# Patient Record
Sex: MALE | Race: BLACK OR AFRICAN AMERICAN | NOT HISPANIC OR LATINO | Employment: FULL TIME | URBAN - METROPOLITAN AREA
[De-identification: names, ages, dates, MRNs, and addresses within clinical notes are randomized per-mention and may not be internally consistent; named-entity substitution may affect disease eponyms.]

---

## 2020-09-19 NOTE — PROGRESS NOTES
Subjective:       Patient ID: Juan Carlos Cash is a pleasant 39 y.o. Black or  male patient    Chief Complaint: Annual Exam      Patient new to me and our practice.  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    HPI     He reports not having had a physical for 17 years.  He is anxious to come back and see a physician, it makes him very nervous.   He was on BP medications until 2 years ago, he does not remember which ones and cannot say why he hold these.  He has no specific complaint but feels short-winded at any effort due to morbid obesity.  He is dedicated to do better as he is aware hat his lifestyle is the main culprit for this.  He has a very poor diet and also does not exercise anymore while he was very active while in college.   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 He needs a medical clearance for this as well as the flu shot.    He would like to be tested for COVID though it is not required for his job.   He has skin lesions on lower extremities as well as on his scalp and beard.    He was told that he may have proteinuria?      Patient Active Problem List   Diagnosis    Class 3 severe obesity due to excess calories with serious comorbidity and body mass index (BMI) greater than or equal to 70 in adult    Shortness of breath    Essential hypertension    Seborrheic dermatitis of scalp    Proteinuria          ACTIVE MEDICAL ISSUES:  Documented in Problem List     PAST MEDICAL HISTORY  Documented     PAST SURGICAL HISTORY:  Documented     SOCIAL HISTORY:  Documented     FAMILY HISTORY:  Documented     ALLERGIES AND MEDICATIONS: updated and reviewed.  Documented    Review of Systems   Constitutional: Negative for activity change, appetite change and fatigue.    HENT: Negative.  Negative for postnasal drip, rhinorrhea, sinus pressure and sore throat.    Eyes: Negative for pain and redness.   Respiratory: Positive for shortness of breath. Negative for cough, chest tightness and wheezing.    Cardiovascular: Negative for chest pain, palpitations and leg swelling.   Gastrointestinal: Negative for abdominal pain and constipation.   Genitourinary: Negative for frequency and urgency.   Musculoskeletal: Negative for back pain, gait problem and myalgias.   Skin: Positive for rash. Negative for color change.   Allergic/Immunologic: Negative for environmental allergies and food allergies.   Neurological: Negative for headaches.   Hematological: Negative for adenopathy. Does not bruise/bleed easily.   Psychiatric/Behavioral: Negative for behavioral problems, sleep disturbance and suicidal ideas. The patient is not nervous/anxious.        Objective:      Physical Exam  Vitals signs and nursing note reviewed.   Constitutional:       Appearance: He is well-developed. He is obese.   HENT:      Right Ear: External ear normal.      Left Ear: External ear normal.   Eyes:      Conjunctiva/sclera: Conjunctivae normal.      Pupils: Pupils are equal, round, and reactive to light.   Neck:      Musculoskeletal: Normal range of motion.      Thyroid: No thyromegaly.   Cardiovascular:      Rate and Rhythm: Normal rate and regular rhythm.      Heart sounds: Normal heart sounds.   Pulmonary:      Effort: Pulmonary effort is normal.      Breath sounds: Normal breath sounds. No wheezing.   Chest:      Chest wall: No tenderness.   Abdominal:      General: Bowel sounds are normal.      Palpations: Abdomen is soft. There is no mass.      Tenderness: There is no abdominal tenderness.   Musculoskeletal: Normal range of motion.         General: No tenderness or deformity.   Lymphadenopathy:      Cervical: No cervical adenopathy.   Skin:     General: Skin is warm and dry.      Comments: Severe seborrheic  "dermatitis of the scalp and beard area.   LE with very dry skin that is cracked, with dermatitis   Neurological:      Mental Status: He is alert and oriented to person, place, and time.   Psychiatric:         Behavior: Behavior normal.         Thought Content: Thought content normal.         Judgment: Judgment normal.         Vitals:    09/21/20 0828   BP: (!) 170/106   BP Location: Left arm   Patient Position: Sitting   BP Method: Thigh Cuff (Manual)   Pulse: 105   Resp: 20   Temp: 99.5 °F (37.5 °C)   TempSrc: Oral   SpO2: 97%   Weight: (!) 244.1 kg (538 lb 2.3 oz)   Height: 5' 7" (1.702 m)     Body mass index is 84.29 kg/m².    RESULTS:  Will review present blood work.    Assessment:       1. Encounter for annual physical exam    2. Class 3 severe obesity due to excess calories with serious comorbidity and body mass index (BMI) greater than or equal to 70 in adult    3. Shortness of breath    4. Essential hypertension    5. Seborrheic dermatitis of scalp    6. Proteinuria, unspecified type    7. Need for influenza vaccination        Plan:   Juan Carlos was seen today for annual exam.    Diagnoses and all orders for this visit:    Encounter for annual physical exam  -     CBC auto differential; Future  -     Comprehensive metabolic panel; Future  -     Hemoglobin A1C; Future  -     TSH; Future  -     Lipid Panel; Future    Patient with BMI 84, short-winded possibly only due to this, however cannot rule out HFpEF due to habitus. Will probably need to see cardiologist.  Will do blood work including BNP, and recheck for proteinuria due to his report of being told that he had this condition in the past.  Will refer to nutritionist as initial step as lifestyle is very poor.    Will be seen on the end of the week to reassess after blood work by MARIBEL العلي, as I am out of town until 10/05/2020, day he is scheduled to go back to teach in the school. 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 For now remote only. Need to see what are his risk factors. May have to have a TTE done to rule out HFrEF, and to see if DM, as these may add to the risk factor of morbid obesity.I will see him back after one month afterwards to f-up and to establish a good patient-physician relationship.    Class 3 severe obesity due to excess calories with serious comorbidity and body mass index (BMI) greater than or equal to 70 in adult  -     Ambulatory referral/consult to Nutrition Services; Future    See above.    Shortness of breath  -     Brain Natriuretic Peptide; Future    See above.    Essential hypertension    BP not at goal.  Will need to be reassessed for treatment after receiving results of the blood work.    Seborrheic dermatitis of scalp  -     ketoconazole (NIZORAL) 2 % shampoo; Apply topically twice a week.    Will order Nizoral shampoo.    Proteinuria, unspecified type  -     Microalbumin/creatinine urine ratio; Future    Will check as reported by patient, see HPI.    Need for influenza vaccination  -     Influenza - Quadrivalent (PF)    Administered today.      Follow up in about 4 days (around 9/25/2020) for f-up with Stephanie Bills.    This note was created by combination of typed  and M-Modal dictation.  Transcription errors may be present.  If there are any questions, please contact me.

## 2020-09-21 ENCOUNTER — OFFICE VISIT (OUTPATIENT)
Dept: FAMILY MEDICINE | Facility: CLINIC | Age: 40
End: 2020-09-21
Payer: COMMERCIAL

## 2020-09-21 VITALS
RESPIRATION RATE: 20 BRPM | DIASTOLIC BLOOD PRESSURE: 106 MMHG | OXYGEN SATURATION: 97 % | TEMPERATURE: 100 F | BODY MASS INDEX: 49.44 KG/M2 | HEART RATE: 105 BPM | HEIGHT: 67 IN | WEIGHT: 315 LBS | SYSTOLIC BLOOD PRESSURE: 170 MMHG

## 2020-09-21 DIAGNOSIS — Z00.00 ENCOUNTER FOR ANNUAL PHYSICAL EXAM: ICD-10-CM

## 2020-09-21 DIAGNOSIS — I10 ESSENTIAL HYPERTENSION: ICD-10-CM

## 2020-09-21 DIAGNOSIS — L21.9 SEBORRHEIC DERMATITIS OF SCALP: ICD-10-CM

## 2020-09-21 DIAGNOSIS — R06.02 SHORTNESS OF BREATH: ICD-10-CM

## 2020-09-21 DIAGNOSIS — E66.01 CLASS 3 SEVERE OBESITY DUE TO EXCESS CALORIES WITH SERIOUS COMORBIDITY AND BODY MASS INDEX (BMI) GREATER THAN OR EQUAL TO 70 IN ADULT: ICD-10-CM

## 2020-09-21 DIAGNOSIS — Z23 NEED FOR INFLUENZA VACCINATION: ICD-10-CM

## 2020-09-21 DIAGNOSIS — R80.9 PROTEINURIA, UNSPECIFIED TYPE: ICD-10-CM

## 2020-09-21 PROBLEM — E66.813 CLASS 3 SEVERE OBESITY DUE TO EXCESS CALORIES WITH SERIOUS COMORBIDITY AND BODY MASS INDEX (BMI) GREATER THAN OR EQUAL TO 70 IN ADULT: Status: ACTIVE | Noted: 2020-09-21

## 2020-09-21 PROCEDURE — 99999 PR PBB SHADOW E&M-NEW PATIENT-LVL IV: ICD-10-PCS | Mod: PBBFAC,,, | Performed by: INTERNAL MEDICINE

## 2020-09-21 PROCEDURE — 90686 FLU VACCINE (QUAD) GREATER THAN OR EQUAL TO 3YO PRESERVATIVE FREE IM: ICD-10-PCS | Mod: S$GLB,,, | Performed by: INTERNAL MEDICINE

## 2020-09-21 PROCEDURE — 90471 FLU VACCINE (QUAD) GREATER THAN OR EQUAL TO 3YO PRESERVATIVE FREE IM: ICD-10-PCS | Mod: S$GLB,,, | Performed by: INTERNAL MEDICINE

## 2020-09-21 PROCEDURE — 99999 PR PBB SHADOW E&M-NEW PATIENT-LVL IV: CPT | Mod: PBBFAC,,, | Performed by: INTERNAL MEDICINE

## 2020-09-21 PROCEDURE — 99385 PR PREVENTIVE VISIT,NEW,18-39: ICD-10-PCS | Mod: 25,S$GLB,, | Performed by: INTERNAL MEDICINE

## 2020-09-21 PROCEDURE — 90686 IIV4 VACC NO PRSV 0.5 ML IM: CPT | Mod: S$GLB,,, | Performed by: INTERNAL MEDICINE

## 2020-09-21 PROCEDURE — 99385 PREV VISIT NEW AGE 18-39: CPT | Mod: 25,S$GLB,, | Performed by: INTERNAL MEDICINE

## 2020-09-21 PROCEDURE — 90471 IMMUNIZATION ADMIN: CPT | Mod: S$GLB,,, | Performed by: INTERNAL MEDICINE

## 2020-09-21 RX ORDER — KETOCONAZOLE 20 MG/ML
SHAMPOO, SUSPENSION TOPICAL
Qty: 120 ML | Refills: 2 | Status: CANCELLED | OUTPATIENT
Start: 2020-09-21

## 2020-09-21 RX ORDER — KETOCONAZOLE 20 MG/ML
SHAMPOO, SUSPENSION TOPICAL
Qty: 120 ML | Refills: 2 | Status: SHIPPED | OUTPATIENT
Start: 2020-09-21 | End: 2020-10-01 | Stop reason: SDUPTHER

## 2020-09-21 NOTE — ASSESSMENT & PLAN NOTE
BMI 84, patient not seen for several years.  Will need referral to nutritionist and close follow-up with blood work.  Concerning situation in this young patient.

## 2020-09-22 ENCOUNTER — TELEPHONE (OUTPATIENT)
Dept: FAMILY MEDICINE | Facility: CLINIC | Age: 40
End: 2020-09-22

## 2020-09-22 ENCOUNTER — LAB VISIT (OUTPATIENT)
Dept: LAB | Facility: HOSPITAL | Age: 40
End: 2020-09-22
Attending: INTERNAL MEDICINE
Payer: COMMERCIAL

## 2020-09-22 DIAGNOSIS — L21.9 SEBORRHEIC DERMATITIS OF SCALP: ICD-10-CM

## 2020-09-22 DIAGNOSIS — R06.02 SHORTNESS OF BREATH: ICD-10-CM

## 2020-09-22 DIAGNOSIS — Z00.00 ENCOUNTER FOR ANNUAL PHYSICAL EXAM: ICD-10-CM

## 2020-09-22 LAB
ALBUMIN SERPL BCP-MCNC: 3.9 G/DL (ref 3.5–5.2)
ALP SERPL-CCNC: 65 U/L (ref 55–135)
ALT SERPL W/O P-5'-P-CCNC: 37 U/L (ref 10–44)
ANION GAP SERPL CALC-SCNC: 7 MMOL/L (ref 8–16)
AST SERPL-CCNC: 37 U/L (ref 10–40)
BASOPHILS # BLD AUTO: 0.04 K/UL (ref 0–0.2)
BASOPHILS NFR BLD: 0.5 % (ref 0–1.9)
BILIRUB SERPL-MCNC: 0.5 MG/DL (ref 0.1–1)
BNP SERPL-MCNC: <10 PG/ML (ref 0–99)
BUN SERPL-MCNC: 2 MG/DL (ref 6–20)
CALCIUM SERPL-MCNC: 9.3 MG/DL (ref 8.7–10.5)
CHLORIDE SERPL-SCNC: 101 MMOL/L (ref 95–110)
CHOLEST SERPL-MCNC: 102 MG/DL (ref 120–199)
CHOLEST/HDLC SERPL: 2.5 {RATIO} (ref 2–5)
CO2 SERPL-SCNC: 30 MMOL/L (ref 23–29)
CREAT SERPL-MCNC: 1 MG/DL (ref 0.5–1.4)
DIFFERENTIAL METHOD: ABNORMAL
EOSINOPHIL # BLD AUTO: 0.1 K/UL (ref 0–0.5)
EOSINOPHIL NFR BLD: 1.5 % (ref 0–8)
ERYTHROCYTE [DISTWIDTH] IN BLOOD BY AUTOMATED COUNT: 14.6 % (ref 11.5–14.5)
EST. GFR  (AFRICAN AMERICAN): >60 ML/MIN/1.73 M^2
EST. GFR  (NON AFRICAN AMERICAN): >60 ML/MIN/1.73 M^2
ESTIMATED AVG GLUCOSE: 146 MG/DL (ref 68–131)
GLUCOSE SERPL-MCNC: 129 MG/DL (ref 70–110)
HBA1C MFR BLD HPLC: 6.7 % (ref 4–5.6)
HCT VFR BLD AUTO: 41.7 % (ref 40–54)
HDLC SERPL-MCNC: 41 MG/DL (ref 40–75)
HDLC SERPL: 40.2 % (ref 20–50)
HGB BLD-MCNC: 12.7 G/DL (ref 14–18)
IMM GRANULOCYTES # BLD AUTO: 0.01 K/UL (ref 0–0.04)
IMM GRANULOCYTES NFR BLD AUTO: 0.1 % (ref 0–0.5)
LDLC SERPL CALC-MCNC: 49.6 MG/DL (ref 63–159)
LYMPHOCYTES # BLD AUTO: 2.1 K/UL (ref 1–4.8)
LYMPHOCYTES NFR BLD: 27.8 % (ref 18–48)
MCH RBC QN AUTO: 26.1 PG (ref 27–31)
MCHC RBC AUTO-ENTMCNC: 30.5 G/DL (ref 32–36)
MCV RBC AUTO: 86 FL (ref 82–98)
MONOCYTES # BLD AUTO: 0.5 K/UL (ref 0.3–1)
MONOCYTES NFR BLD: 7 % (ref 4–15)
NEUTROPHILS # BLD AUTO: 4.7 K/UL (ref 1.8–7.7)
NEUTROPHILS NFR BLD: 63.1 % (ref 38–73)
NONHDLC SERPL-MCNC: 61 MG/DL
NRBC BLD-RTO: 0 /100 WBC
PLATELET # BLD AUTO: 285 K/UL (ref 150–350)
PMV BLD AUTO: 10.8 FL (ref 9.2–12.9)
POTASSIUM SERPL-SCNC: 4.4 MMOL/L (ref 3.5–5.1)
PROT SERPL-MCNC: 7.9 G/DL (ref 6–8.4)
RBC # BLD AUTO: 4.86 M/UL (ref 4.6–6.2)
SODIUM SERPL-SCNC: 138 MMOL/L (ref 136–145)
TRIGL SERPL-MCNC: 57 MG/DL (ref 30–150)
TSH SERPL DL<=0.005 MIU/L-ACNC: 2.69 UIU/ML (ref 0.4–4)
WBC # BLD AUTO: 7.45 K/UL (ref 3.9–12.7)

## 2020-09-22 PROCEDURE — 83036 HEMOGLOBIN GLYCOSYLATED A1C: CPT

## 2020-09-22 PROCEDURE — 36415 COLL VENOUS BLD VENIPUNCTURE: CPT | Mod: PO

## 2020-09-22 PROCEDURE — 83880 ASSAY OF NATRIURETIC PEPTIDE: CPT

## 2020-09-22 PROCEDURE — 80061 LIPID PANEL: CPT

## 2020-09-22 PROCEDURE — 85025 COMPLETE CBC W/AUTO DIFF WBC: CPT

## 2020-09-22 PROCEDURE — 80053 COMPREHEN METABOLIC PANEL: CPT

## 2020-09-22 PROCEDURE — 84443 ASSAY THYROID STIM HORMONE: CPT

## 2020-09-22 RX ORDER — KETOCONAZOLE 20 MG/ML
SHAMPOO, SUSPENSION TOPICAL
Qty: 120 ML | Refills: 2 | Status: CANCELLED | OUTPATIENT
Start: 2020-09-24

## 2020-09-22 NOTE — TELEPHONE ENCOUNTER
----- Message from Diane Baires MD sent at 9/21/2020  4:46 PM CDT -----  Regarding: appt?  Possible to see why Mr. Cash is not on the schedule to see Stephanie at the end of this week? He was supposed to f-up with her in my absence as discussed with her. Also to see me back in around one month.Thanks!

## 2020-09-25 DIAGNOSIS — Z11.59 NEED FOR HEPATITIS C SCREENING TEST: ICD-10-CM

## 2020-10-01 ENCOUNTER — OFFICE VISIT (OUTPATIENT)
Dept: FAMILY MEDICINE | Facility: CLINIC | Age: 40
End: 2020-10-01
Payer: COMMERCIAL

## 2020-10-01 VITALS
BODY MASS INDEX: 49.44 KG/M2 | SYSTOLIC BLOOD PRESSURE: 147 MMHG | HEIGHT: 67 IN | DIASTOLIC BLOOD PRESSURE: 93 MMHG | HEART RATE: 109 BPM | OXYGEN SATURATION: 96 % | TEMPERATURE: 98 F | WEIGHT: 315 LBS | RESPIRATION RATE: 19 BRPM

## 2020-10-01 DIAGNOSIS — E11.9 TYPE 2 DIABETES MELLITUS WITHOUT COMPLICATION, WITHOUT LONG-TERM CURRENT USE OF INSULIN: Primary | ICD-10-CM

## 2020-10-01 DIAGNOSIS — R06.02 SHORTNESS OF BREATH: ICD-10-CM

## 2020-10-01 DIAGNOSIS — L21.9 SEBORRHEIC DERMATITIS OF SCALP: ICD-10-CM

## 2020-10-01 DIAGNOSIS — I10 ESSENTIAL HYPERTENSION: ICD-10-CM

## 2020-10-01 PROCEDURE — 3044F HG A1C LEVEL LT 7.0%: CPT | Mod: CPTII,S$GLB,, | Performed by: PHYSICIAN ASSISTANT

## 2020-10-01 PROCEDURE — 99999 PR PBB SHADOW E&M-EST. PATIENT-LVL IV: CPT | Mod: PBBFAC,,, | Performed by: PHYSICIAN ASSISTANT

## 2020-10-01 PROCEDURE — 3008F BODY MASS INDEX DOCD: CPT | Mod: CPTII,S$GLB,, | Performed by: PHYSICIAN ASSISTANT

## 2020-10-01 PROCEDURE — 3044F PR MOST RECENT HEMOGLOBIN A1C LEVEL <7.0%: ICD-10-PCS | Mod: CPTII,S$GLB,, | Performed by: PHYSICIAN ASSISTANT

## 2020-10-01 PROCEDURE — 99214 OFFICE O/P EST MOD 30 MIN: CPT | Mod: S$GLB,,, | Performed by: PHYSICIAN ASSISTANT

## 2020-10-01 PROCEDURE — 3008F PR BODY MASS INDEX (BMI) DOCUMENTED: ICD-10-PCS | Mod: CPTII,S$GLB,, | Performed by: PHYSICIAN ASSISTANT

## 2020-10-01 PROCEDURE — 99214 PR OFFICE/OUTPT VISIT, EST, LEVL IV, 30-39 MIN: ICD-10-PCS | Mod: S$GLB,,, | Performed by: PHYSICIAN ASSISTANT

## 2020-10-01 PROCEDURE — 99999 PR PBB SHADOW E&M-EST. PATIENT-LVL IV: ICD-10-PCS | Mod: PBBFAC,,, | Performed by: PHYSICIAN ASSISTANT

## 2020-10-01 RX ORDER — LOSARTAN POTASSIUM AND HYDROCHLOROTHIAZIDE 12.5; 5 MG/1; MG/1
1 TABLET ORAL DAILY
Qty: 90 TABLET | Refills: 0 | Status: SHIPPED | OUTPATIENT
Start: 2020-10-01 | End: 2021-04-07 | Stop reason: SDUPTHER

## 2020-10-01 RX ORDER — METFORMIN HYDROCHLORIDE 500 MG/1
TABLET ORAL
Qty: 360 TABLET | Refills: 0 | Status: SHIPPED | OUTPATIENT
Start: 2020-10-01 | End: 2020-10-02 | Stop reason: SDUPTHER

## 2020-10-01 RX ORDER — KETOCONAZOLE 20 MG/ML
SHAMPOO, SUSPENSION TOPICAL
Qty: 120 ML | Refills: 2 | Status: SHIPPED | OUTPATIENT
Start: 2020-10-01 | End: 2021-04-07 | Stop reason: SDUPTHER

## 2020-10-01 NOTE — PROGRESS NOTES
Subjective:       Patient ID: Juan Carlos Cash is a 39 y.o. male.    Chief Complaint: Diabetes and Hypertension    HPI:  39-year-old male with morbid obesity presents for lab work follow-up.  His hemoglobin A1c was elevated at 6.7.  Patient states he has never been diagnosed with diabetes.  He denies increased thirst and urination.  His cholesterol was well controlled.  BNP was in normal range.  Patient does experience shortness of breath with little exertion.  He says he has intermittent swelling in his legs and ankles.  His blood pressure is elevated and states he has been on blood pressure medication in the past but currently on nothing.  He is aware that he needs to eat better and lose weight.  He states he wants to start a diet and exercise program.  He states he currently eats out a lot including fast food and fried foods, due to being convenient.  He states diabetes and heart disease runs in his family.    Social History     Socioeconomic History    Marital status: Single     Spouse name: Not on file    Number of children: Not on file    Years of education: Not on file    Highest education level: Not on file   Occupational History    Not on file   Social Needs    Financial resource strain: Not on file    Food insecurity     Worry: Not on file     Inability: Not on file    Transportation needs     Medical: Not on file     Non-medical: Not on file   Tobacco Use    Smoking status: Never Smoker    Smokeless tobacco: Never Used   Substance and Sexual Activity    Alcohol use: Not Currently    Drug use: Never    Sexual activity: Not on file   Lifestyle    Physical activity     Days per week: Not on file     Minutes per session: Not on file    Stress: Not on file   Relationships    Social connections     Talks on phone: Not on file     Gets together: Not on file     Attends Taoist service: Not on file     Active member of club or organization: Not on file     Attends meetings of clubs or  organizations: Not on file     Relationship status: Not on file   Other Topics Concern    Not on file   Social History Narrative    From [place]. No kids.    Teacher.       Review of Systems   Constitutional: Negative for fever and unexpected weight change.   Respiratory: Positive for shortness of breath (on exertion ). Negative for cough and wheezing.    Cardiovascular: Positive for leg swelling. Negative for chest pain and palpitations.         Objective:      Physical Exam  Constitutional:       Appearance: He is obese.      Comments: Morbid obesity   HENT:      Head: Normocephalic and atraumatic.   Cardiovascular:      Rate and Rhythm: Tachycardia present.   Pulmonary:      Effort: Pulmonary effort is normal.      Breath sounds: Normal breath sounds.   Neurological:      General: No focal deficit present.      Mental Status: He is alert and oriented to person, place, and time.   Psychiatric:         Mood and Affect: Mood normal.         Behavior: Behavior normal.         Assessment:       1. Type 2 diabetes mellitus without complication, without long-term current use of insulin    2. Essential hypertension    3. Shortness of breath    4. Seborrheic dermatitis of scalp        Plan:         Juan Carlos was seen today for diabetes and hypertension.    Diagnoses and all orders for this visit:    Type 2 diabetes mellitus without complication, without long-term current use of insulin  -     metFORMIN (GLUCOPHAGE) 500 MG tablet; Take 1 tab with breakfast for 1 week, then 1 with breakfast 1 dinner for 1 week, then 2 breakfast 1 dinner, then 2 breakfast 2 dinner  -     discussed side effects of metformin including GI upset and diarrhea.  Advised will starts slowly increase to 2000 mg per day.  I advised him to notify us if side effects are unbearable  -     Discussed proper diet with patient. 45-60g of carbs per meal. 15-30g of carbs per snack. Avoid bread, rice, pasta, and sweets. Eat wheat over white. Avoid fruit  juice, soft drinks, and other sugary drinks.   -     one-month follow-up scheduled with PCP    Essential hypertension  -     losartan-hydrochlorothiazide 50-12.5 mg (HYZAAR) 50-12.5 mg per tablet; Take 1 tablet by mouth once daily.  -     start above medication.  Losartan for renal protection due to diabetes.  Discussed following a low-salt diet and avoiding fried foods; discussed needing exercise however I advised patient that I would like him to be cleared by cardiology 1st.    Shortness of breath  -     Ambulatory referral/consult to Cardiology; Future  -     would like patient to be cleared by cardiology prior to starting exercise regimen; follow-up with PCP in 1 month    Seborrheic dermatitis of scalp  -     ketoconazole (NIZORAL) 2 % shampoo; Apply topically twice a week.

## 2020-10-02 ENCOUNTER — TELEPHONE (OUTPATIENT)
Dept: FAMILY MEDICINE | Facility: CLINIC | Age: 40
End: 2020-10-02

## 2020-10-02 ENCOUNTER — PATIENT MESSAGE (OUTPATIENT)
Dept: FAMILY MEDICINE | Facility: CLINIC | Age: 40
End: 2020-10-02

## 2020-10-02 DIAGNOSIS — E11.9 TYPE 2 DIABETES MELLITUS WITHOUT COMPLICATION, WITHOUT LONG-TERM CURRENT USE OF INSULIN: ICD-10-CM

## 2020-10-02 RX ORDER — METFORMIN HYDROCHLORIDE 500 MG/1
TABLET ORAL
Qty: 360 TABLET | Refills: 0 | Status: CANCELLED | OUTPATIENT
Start: 2020-10-02

## 2020-10-02 RX ORDER — METFORMIN HYDROCHLORIDE 500 MG/1
TABLET ORAL
Qty: 360 TABLET | Refills: 0 | Status: SHIPPED | OUTPATIENT
Start: 2020-10-02 | End: 2023-04-05

## 2020-10-02 RX ORDER — METFORMIN HYDROCHLORIDE 500 MG/1
TABLET ORAL
Qty: 360 TABLET | Refills: 0 | Status: SHIPPED | OUTPATIENT
Start: 2020-10-02 | End: 2020-10-02 | Stop reason: SDUPTHER

## 2020-10-02 NOTE — TELEPHONE ENCOUNTER
Spoke to pt advised we cannot email letter. Advised I will send to his mychart and he can email it to supervisor. Pt expressed understanding

## 2020-10-02 NOTE — LETTER
October 2, 2020    Juan Carlos Cash  [address] [address]             [facility]  [address] [facility] [address]  Phone: [phone]  Fax: [phone] To Whom It May Concern,     Mr. Cash suffers from diabetes, hypertension, and morbid obesity.  This puts him at increased risk for poor outcomes due to the COVID-19 virus.  It is recommended that due to these risk factors the patient works from home virtually, while COVID is prevalent in our community.  Please contact our office with any questions.        Stephanie Bills PA-C

## 2020-10-02 NOTE — TELEPHONE ENCOUNTER
----- Message from Yolis Fermin sent at 10/2/2020  7:55 AM CDT -----  Regarding: Self/  [phone]  Type: Patient Call Back    Who called:  Patient    What is the request in detail:  Patient stated he forgot to leave HR managers name, Yazmin Noe  [phone] and her email address  [email or web address].  This is for letter for accommodation due to covid.  Thank you    Would the patient rather a call back or a response via [facility]?   Call back    Best call back number:    [phone]

## 2020-10-02 NOTE — LETTER
October 2, 2020    Juan Carlos Cash  [address] [facility] [address]             [facility]  [address] [facility] [address]  Phone: [phone]  Fax: [phone] To Whom It May Concern,     Mr. Cash suffers from diabetes, hypertension, and morbid obesity.  This puts him at increased risk for poor outcomes due to the COVID-19 virus.  It is recommended that due to these risk factors the patient works from home virtually, while COVID is prevalent in our community.  Please contact our office with any questions.        Stephanie Bills PA-C

## 2020-10-20 ENCOUNTER — PATIENT OUTREACH (OUTPATIENT)
Dept: ADMINISTRATIVE | Facility: HOSPITAL | Age: 40
End: 2020-10-20

## 2021-03-17 DIAGNOSIS — I10 ESSENTIAL HYPERTENSION: ICD-10-CM

## 2021-03-17 DIAGNOSIS — L21.9 SEBORRHEIC DERMATITIS OF SCALP: ICD-10-CM

## 2021-03-17 RX ORDER — LOSARTAN POTASSIUM AND HYDROCHLOROTHIAZIDE 12.5; 5 MG/1; MG/1
1 TABLET ORAL DAILY
Qty: 90 TABLET | Refills: 0 | Status: CANCELLED | OUTPATIENT
Start: 2021-03-17 | End: 2022-03-17

## 2021-03-17 RX ORDER — KETOCONAZOLE 20 MG/ML
SHAMPOO, SUSPENSION TOPICAL
Qty: 120 ML | Refills: 0 | OUTPATIENT
Start: 2021-03-18

## 2021-03-17 RX ORDER — LOSARTAN POTASSIUM AND HYDROCHLOROTHIAZIDE 12.5; 5 MG/1; MG/1
1 TABLET ORAL DAILY
Qty: 90 TABLET | Refills: 0 | OUTPATIENT
Start: 2021-03-17 | End: 2022-03-17

## 2021-04-01 ENCOUNTER — TELEPHONE (OUTPATIENT)
Dept: FAMILY MEDICINE | Facility: CLINIC | Age: 41
End: 2021-04-01

## 2021-04-07 ENCOUNTER — OFFICE VISIT (OUTPATIENT)
Dept: FAMILY MEDICINE | Facility: CLINIC | Age: 41
End: 2021-04-07
Payer: COMMERCIAL

## 2021-04-07 ENCOUNTER — PATIENT OUTREACH (OUTPATIENT)
Dept: ADMINISTRATIVE | Facility: HOSPITAL | Age: 41
End: 2021-04-07

## 2021-04-07 ENCOUNTER — LAB VISIT (OUTPATIENT)
Dept: LAB | Facility: HOSPITAL | Age: 41
End: 2021-04-07
Attending: INTERNAL MEDICINE
Payer: COMMERCIAL

## 2021-04-07 VITALS
HEART RATE: 96 BPM | WEIGHT: 315 LBS | TEMPERATURE: 99 F | OXYGEN SATURATION: 96 % | RESPIRATION RATE: 20 BRPM | BODY MASS INDEX: 49.44 KG/M2 | HEIGHT: 67 IN

## 2021-04-07 DIAGNOSIS — Z79.4 TYPE 2 DIABETES MELLITUS WITH HYPERGLYCEMIA, WITH LONG-TERM CURRENT USE OF INSULIN: ICD-10-CM

## 2021-04-07 DIAGNOSIS — E11.65 TYPE 2 DIABETES MELLITUS WITH HYPERGLYCEMIA, WITH LONG-TERM CURRENT USE OF INSULIN: ICD-10-CM

## 2021-04-07 DIAGNOSIS — R40.0 DAYTIME SLEEPINESS: ICD-10-CM

## 2021-04-07 DIAGNOSIS — I10 ESSENTIAL HYPERTENSION: ICD-10-CM

## 2021-04-07 DIAGNOSIS — R06.02 SHORTNESS OF BREATH: ICD-10-CM

## 2021-04-07 DIAGNOSIS — E66.01 CLASS 3 SEVERE OBESITY DUE TO EXCESS CALORIES WITH SERIOUS COMORBIDITY AND BODY MASS INDEX (BMI) GREATER THAN OR EQUAL TO 70 IN ADULT: ICD-10-CM

## 2021-04-07 DIAGNOSIS — R06.83 SNORING: ICD-10-CM

## 2021-04-07 DIAGNOSIS — L21.9 SEBORRHEIC DERMATITIS OF SCALP: ICD-10-CM

## 2021-04-07 LAB
BASOPHILS # BLD AUTO: 0.04 K/UL (ref 0–0.2)
BASOPHILS NFR BLD: 0.6 % (ref 0–1.9)
DIFFERENTIAL METHOD: ABNORMAL
EOSINOPHIL # BLD AUTO: 0.2 K/UL (ref 0–0.5)
EOSINOPHIL NFR BLD: 2.4 % (ref 0–8)
ERYTHROCYTE [DISTWIDTH] IN BLOOD BY AUTOMATED COUNT: 14.6 % (ref 11.5–14.5)
ESTIMATED AVG GLUCOSE: 154 MG/DL (ref 68–131)
HBA1C MFR BLD: 7 % (ref 4–5.6)
HCT VFR BLD AUTO: 42.4 % (ref 40–54)
HGB BLD-MCNC: 13.3 G/DL (ref 14–18)
IMM GRANULOCYTES # BLD AUTO: 0.03 K/UL (ref 0–0.04)
IMM GRANULOCYTES NFR BLD AUTO: 0.5 % (ref 0–0.5)
LYMPHOCYTES # BLD AUTO: 1.7 K/UL (ref 1–4.8)
LYMPHOCYTES NFR BLD: 25.5 % (ref 18–48)
MCH RBC QN AUTO: 26.1 PG (ref 27–31)
MCHC RBC AUTO-ENTMCNC: 31.4 G/DL (ref 32–36)
MCV RBC AUTO: 83 FL (ref 82–98)
MONOCYTES # BLD AUTO: 0.4 K/UL (ref 0.3–1)
MONOCYTES NFR BLD: 5.9 % (ref 4–15)
NEUTROPHILS # BLD AUTO: 4.3 K/UL (ref 1.8–7.7)
NEUTROPHILS NFR BLD: 65.1 % (ref 38–73)
NRBC BLD-RTO: 0 /100 WBC
PLATELET # BLD AUTO: 303 K/UL (ref 150–450)
PMV BLD AUTO: 10.3 FL (ref 9.2–12.9)
RBC # BLD AUTO: 5.09 M/UL (ref 4.6–6.2)
WBC # BLD AUTO: 6.59 K/UL (ref 3.9–12.7)

## 2021-04-07 PROCEDURE — 99999 PR PBB SHADOW E&M-EST. PATIENT-LVL V: ICD-10-PCS | Mod: PBBFAC,,, | Performed by: INTERNAL MEDICINE

## 2021-04-07 PROCEDURE — 3008F BODY MASS INDEX DOCD: CPT | Mod: CPTII,S$GLB,, | Performed by: INTERNAL MEDICINE

## 2021-04-07 PROCEDURE — 85025 COMPLETE CBC W/AUTO DIFF WBC: CPT | Performed by: INTERNAL MEDICINE

## 2021-04-07 PROCEDURE — 3008F PR BODY MASS INDEX (BMI) DOCUMENTED: ICD-10-PCS | Mod: CPTII,S$GLB,, | Performed by: INTERNAL MEDICINE

## 2021-04-07 PROCEDURE — 1126F AMNT PAIN NOTED NONE PRSNT: CPT | Mod: S$GLB,,, | Performed by: INTERNAL MEDICINE

## 2021-04-07 PROCEDURE — 83880 ASSAY OF NATRIURETIC PEPTIDE: CPT | Performed by: INTERNAL MEDICINE

## 2021-04-07 PROCEDURE — 99215 PR OFFICE/OUTPT VISIT, EST, LEVL V, 40-54 MIN: ICD-10-PCS | Mod: S$GLB,,, | Performed by: INTERNAL MEDICINE

## 2021-04-07 PROCEDURE — 1126F PR PAIN SEVERITY QUANTIFIED, NO PAIN PRESENT: ICD-10-PCS | Mod: S$GLB,,, | Performed by: INTERNAL MEDICINE

## 2021-04-07 PROCEDURE — 99999 PR PBB SHADOW E&M-EST. PATIENT-LVL V: CPT | Mod: PBBFAC,,, | Performed by: INTERNAL MEDICINE

## 2021-04-07 PROCEDURE — 99215 OFFICE O/P EST HI 40 MIN: CPT | Mod: S$GLB,,, | Performed by: INTERNAL MEDICINE

## 2021-04-07 PROCEDURE — 80053 COMPREHEN METABOLIC PANEL: CPT | Performed by: INTERNAL MEDICINE

## 2021-04-07 PROCEDURE — 3044F PR MOST RECENT HEMOGLOBIN A1C LEVEL <7.0%: ICD-10-PCS | Mod: CPTII,S$GLB,, | Performed by: INTERNAL MEDICINE

## 2021-04-07 PROCEDURE — 83036 HEMOGLOBIN GLYCOSYLATED A1C: CPT | Performed by: INTERNAL MEDICINE

## 2021-04-07 PROCEDURE — 36415 COLL VENOUS BLD VENIPUNCTURE: CPT | Mod: PO | Performed by: INTERNAL MEDICINE

## 2021-04-07 PROCEDURE — 3044F HG A1C LEVEL LT 7.0%: CPT | Mod: CPTII,S$GLB,, | Performed by: INTERNAL MEDICINE

## 2021-04-07 RX ORDER — LOSARTAN POTASSIUM AND HYDROCHLOROTHIAZIDE 12.5; 5 MG/1; MG/1
1 TABLET ORAL DAILY
Qty: 90 TABLET | Refills: 0 | Status: SHIPPED | OUTPATIENT
Start: 2021-04-07 | End: 2023-04-05

## 2021-04-07 RX ORDER — KETOCONAZOLE 20 MG/ML
SHAMPOO, SUSPENSION TOPICAL
Qty: 120 ML | Refills: 2 | Status: SHIPPED | OUTPATIENT
Start: 2021-04-08

## 2021-04-08 LAB
ALBUMIN SERPL BCP-MCNC: 4.1 G/DL (ref 3.5–5.2)
ALP SERPL-CCNC: 77 U/L (ref 55–135)
ALT SERPL W/O P-5'-P-CCNC: 57 U/L (ref 10–44)
ANION GAP SERPL CALC-SCNC: 8 MMOL/L (ref 8–16)
AST SERPL-CCNC: 66 U/L (ref 10–40)
BILIRUB SERPL-MCNC: 0.4 MG/DL (ref 0.1–1)
BNP SERPL-MCNC: <10 PG/ML (ref 0–99)
BUN SERPL-MCNC: 7 MG/DL (ref 6–20)
CALCIUM SERPL-MCNC: 9 MG/DL (ref 8.7–10.5)
CHLORIDE SERPL-SCNC: 101 MMOL/L (ref 95–110)
CO2 SERPL-SCNC: 31 MMOL/L (ref 23–29)
CREAT SERPL-MCNC: 1 MG/DL (ref 0.5–1.4)
EST. GFR  (AFRICAN AMERICAN): >60 ML/MIN/1.73 M^2
EST. GFR  (NON AFRICAN AMERICAN): >60 ML/MIN/1.73 M^2
GLUCOSE SERPL-MCNC: 172 MG/DL (ref 70–110)
POTASSIUM SERPL-SCNC: 4.5 MMOL/L (ref 3.5–5.1)
PROT SERPL-MCNC: 8.3 G/DL (ref 6–8.4)
SODIUM SERPL-SCNC: 140 MMOL/L (ref 136–145)

## 2021-04-14 DIAGNOSIS — E11.9 TYPE 2 DIABETES MELLITUS WITHOUT COMPLICATION, UNSPECIFIED WHETHER LONG TERM INSULIN USE: ICD-10-CM

## 2021-04-22 ENCOUNTER — PATIENT MESSAGE (OUTPATIENT)
Dept: FAMILY MEDICINE | Facility: CLINIC | Age: 41
End: 2021-04-22

## 2021-07-07 ENCOUNTER — PATIENT MESSAGE (OUTPATIENT)
Dept: ADMINISTRATIVE | Facility: HOSPITAL | Age: 41
End: 2021-07-07

## 2021-09-23 DIAGNOSIS — E11.9 TYPE 2 DIABETES MELLITUS WITHOUT COMPLICATION: ICD-10-CM

## 2021-10-04 ENCOUNTER — PATIENT MESSAGE (OUTPATIENT)
Dept: ADMINISTRATIVE | Facility: HOSPITAL | Age: 41
End: 2021-10-04

## 2021-10-13 ENCOUNTER — PATIENT MESSAGE (OUTPATIENT)
Dept: ADMINISTRATIVE | Facility: HOSPITAL | Age: 41
End: 2021-10-13
Payer: COMMERCIAL

## 2021-10-13 ENCOUNTER — PATIENT OUTREACH (OUTPATIENT)
Dept: ADMINISTRATIVE | Facility: HOSPITAL | Age: 41
End: 2021-10-13

## 2021-10-13 DIAGNOSIS — Z11.59 NEED FOR HEPATITIS C SCREENING TEST: ICD-10-CM

## 2021-10-13 DIAGNOSIS — E11.9 TYPE 2 DIABETES MELLITUS WITHOUT COMPLICATION, WITHOUT LONG-TERM CURRENT USE OF INSULIN: Primary | ICD-10-CM

## 2021-10-27 ENCOUNTER — PATIENT OUTREACH (OUTPATIENT)
Dept: ADMINISTRATIVE | Facility: HOSPITAL | Age: 41
End: 2021-10-27
Payer: COMMERCIAL

## 2021-10-27 ENCOUNTER — PATIENT MESSAGE (OUTPATIENT)
Dept: ADMINISTRATIVE | Facility: HOSPITAL | Age: 41
End: 2021-10-27
Payer: COMMERCIAL

## 2021-12-15 ENCOUNTER — PATIENT MESSAGE (OUTPATIENT)
Dept: ADMINISTRATIVE | Facility: HOSPITAL | Age: 41
End: 2021-12-15
Payer: COMMERCIAL

## 2022-01-05 ENCOUNTER — PATIENT MESSAGE (OUTPATIENT)
Dept: ADMINISTRATIVE | Facility: HOSPITAL | Age: 42
End: 2022-01-05
Payer: COMMERCIAL

## 2022-04-06 ENCOUNTER — PATIENT MESSAGE (OUTPATIENT)
Dept: ADMINISTRATIVE | Facility: HOSPITAL | Age: 42
End: 2022-04-06
Payer: COMMERCIAL

## 2022-05-20 ENCOUNTER — PATIENT OUTREACH (OUTPATIENT)
Dept: FAMILY MEDICINE | Facility: CLINIC | Age: 42
End: 2022-05-20
Payer: COMMERCIAL

## 2022-05-30 ENCOUNTER — PATIENT MESSAGE (OUTPATIENT)
Dept: ADMINISTRATIVE | Facility: HOSPITAL | Age: 42
End: 2022-05-30
Payer: COMMERCIAL

## 2022-07-14 ENCOUNTER — PATIENT OUTREACH (OUTPATIENT)
Dept: ADMINISTRATIVE | Facility: HOSPITAL | Age: 42
End: 2022-07-14
Payer: COMMERCIAL

## 2022-08-11 ENCOUNTER — PATIENT OUTREACH (OUTPATIENT)
Dept: ADMINISTRATIVE | Facility: HOSPITAL | Age: 42
End: 2022-08-11
Payer: COMMERCIAL

## 2022-08-30 ENCOUNTER — TELEPHONE (OUTPATIENT)
Dept: FAMILY MEDICINE | Facility: CLINIC | Age: 42
End: 2022-08-30
Payer: COMMERCIAL

## 2022-08-30 DIAGNOSIS — E11.9 TYPE 2 DIABETES MELLITUS WITHOUT COMPLICATION, WITHOUT LONG-TERM CURRENT USE OF INSULIN: ICD-10-CM

## 2022-08-30 NOTE — TELEPHONE ENCOUNTER
Due for an eye exam - Left message for patient to return call. If already seen eye provider within 12 months can send efax MANJULA to office will need provider information. If not, if met the criteria for eye cam, we can schedule DM eye photo at clinic or place a referral. Sent [facility] message.

## 2022-09-12 ENCOUNTER — PATIENT OUTREACH (OUTPATIENT)
Dept: ADMINISTRATIVE | Facility: HOSPITAL | Age: 42
End: 2022-09-12
Payer: COMMERCIAL

## 2022-09-21 ENCOUNTER — PATIENT OUTREACH (OUTPATIENT)
Dept: ADMINISTRATIVE | Facility: HOSPITAL | Age: 42
End: 2022-09-21
Payer: COMMERCIAL

## 2022-09-26 ENCOUNTER — PATIENT OUTREACH (OUTPATIENT)
Dept: ADMINISTRATIVE | Facility: HOSPITAL | Age: 42
End: 2022-09-26
Payer: COMMERCIAL

## 2022-10-04 ENCOUNTER — PATIENT OUTREACH (OUTPATIENT)
Dept: ADMINISTRATIVE | Facility: HOSPITAL | Age: 42
End: 2022-10-04
Payer: COMMERCIAL

## 2023-04-05 ENCOUNTER — LAB VISIT (OUTPATIENT)
Dept: LAB | Facility: HOSPITAL | Age: 43
End: 2023-04-05
Attending: INTERNAL MEDICINE
Payer: COMMERCIAL

## 2023-04-05 ENCOUNTER — OFFICE VISIT (OUTPATIENT)
Dept: FAMILY MEDICINE | Facility: CLINIC | Age: 43
End: 2023-04-05
Payer: COMMERCIAL

## 2023-04-05 VITALS
BODY MASS INDEX: 49.44 KG/M2 | RESPIRATION RATE: 16 BRPM | SYSTOLIC BLOOD PRESSURE: 134 MMHG | HEART RATE: 88 BPM | TEMPERATURE: 98 F | WEIGHT: 315 LBS | OXYGEN SATURATION: 95 % | DIASTOLIC BLOOD PRESSURE: 86 MMHG | HEIGHT: 67 IN

## 2023-04-05 DIAGNOSIS — E66.01 CLASS 3 SEVERE OBESITY DUE TO EXCESS CALORIES WITH SERIOUS COMORBIDITY AND BODY MASS INDEX (BMI) GREATER THAN OR EQUAL TO 70 IN ADULT: ICD-10-CM

## 2023-04-05 DIAGNOSIS — Z79.4 TYPE 2 DIABETES MELLITUS WITH HYPERGLYCEMIA, WITH LONG-TERM CURRENT USE OF INSULIN: ICD-10-CM

## 2023-04-05 DIAGNOSIS — I10 ESSENTIAL HYPERTENSION: Primary | ICD-10-CM

## 2023-04-05 DIAGNOSIS — Z13.220 ENCOUNTER FOR LIPID SCREENING FOR CARDIOVASCULAR DISEASE: ICD-10-CM

## 2023-04-05 DIAGNOSIS — Z23 NEED FOR TD VACCINE: ICD-10-CM

## 2023-04-05 DIAGNOSIS — I10 ESSENTIAL HYPERTENSION: ICD-10-CM

## 2023-04-05 DIAGNOSIS — E55.9 VITAMIN D DEFICIENCY: ICD-10-CM

## 2023-04-05 DIAGNOSIS — E11.65 TYPE 2 DIABETES MELLITUS WITH HYPERGLYCEMIA, WITH LONG-TERM CURRENT USE OF INSULIN: ICD-10-CM

## 2023-04-05 DIAGNOSIS — Z13.6 ENCOUNTER FOR LIPID SCREENING FOR CARDIOVASCULAR DISEASE: ICD-10-CM

## 2023-04-05 LAB
25(OH)D3+25(OH)D2 SERPL-MCNC: 5 NG/ML (ref 30–96)
ALBUMIN SERPL BCP-MCNC: 3.9 G/DL (ref 3.5–5.2)
ALP SERPL-CCNC: 74 U/L (ref 55–135)
ALT SERPL W/O P-5'-P-CCNC: 28 U/L (ref 10–44)
ANION GAP SERPL CALC-SCNC: 12 MMOL/L (ref 8–16)
AST SERPL-CCNC: 29 U/L (ref 10–40)
BASOPHILS # BLD AUTO: 0.05 K/UL (ref 0–0.2)
BASOPHILS NFR BLD: 0.7 % (ref 0–1.9)
BILIRUB SERPL-MCNC: 0.4 MG/DL (ref 0.1–1)
BUN SERPL-MCNC: 9 MG/DL (ref 6–20)
CALCIUM SERPL-MCNC: 9.5 MG/DL (ref 8.7–10.5)
CHLORIDE SERPL-SCNC: 104 MMOL/L (ref 95–110)
CHOLEST SERPL-MCNC: 115 MG/DL (ref 120–199)
CHOLEST/HDLC SERPL: 2.6 {RATIO} (ref 2–5)
CO2 SERPL-SCNC: 25 MMOL/L (ref 23–29)
CREAT SERPL-MCNC: 1 MG/DL (ref 0.5–1.4)
DIFFERENTIAL METHOD: ABNORMAL
EOSINOPHIL # BLD AUTO: 0.1 K/UL (ref 0–0.5)
EOSINOPHIL NFR BLD: 1.2 % (ref 0–8)
ERYTHROCYTE [DISTWIDTH] IN BLOOD BY AUTOMATED COUNT: 15.4 % (ref 11.5–14.5)
EST. GFR  (NO RACE VARIABLE): >60 ML/MIN/1.73 M^2
ESTIMATED AVG GLUCOSE: 117 MG/DL (ref 68–131)
GLUCOSE SERPL-MCNC: 90 MG/DL (ref 70–110)
HBA1C MFR BLD: 5.7 % (ref 4–5.6)
HCT VFR BLD AUTO: 40.4 % (ref 40–54)
HDLC SERPL-MCNC: 45 MG/DL (ref 40–75)
HDLC SERPL: 39.1 % (ref 20–50)
HGB BLD-MCNC: 12.8 G/DL (ref 14–18)
IMM GRANULOCYTES # BLD AUTO: 0.01 K/UL (ref 0–0.04)
IMM GRANULOCYTES NFR BLD AUTO: 0.1 % (ref 0–0.5)
LDLC SERPL CALC-MCNC: 58.6 MG/DL (ref 63–159)
LYMPHOCYTES # BLD AUTO: 2.2 K/UL (ref 1–4.8)
LYMPHOCYTES NFR BLD: 29.3 % (ref 18–48)
MCH RBC QN AUTO: 26.7 PG (ref 27–31)
MCHC RBC AUTO-ENTMCNC: 31.7 G/DL (ref 32–36)
MCV RBC AUTO: 84 FL (ref 82–98)
MONOCYTES # BLD AUTO: 0.5 K/UL (ref 0.3–1)
MONOCYTES NFR BLD: 6.2 % (ref 4–15)
NEUTROPHILS # BLD AUTO: 4.6 K/UL (ref 1.8–7.7)
NEUTROPHILS NFR BLD: 62.5 % (ref 38–73)
NONHDLC SERPL-MCNC: 70 MG/DL
NRBC BLD-RTO: 0 /100 WBC
PLATELET # BLD AUTO: 298 K/UL (ref 150–450)
PMV BLD AUTO: 10.7 FL (ref 9.2–12.9)
POTASSIUM SERPL-SCNC: 4.1 MMOL/L (ref 3.5–5.1)
PROT SERPL-MCNC: 7.9 G/DL (ref 6–8.4)
RBC # BLD AUTO: 4.8 M/UL (ref 4.6–6.2)
SODIUM SERPL-SCNC: 141 MMOL/L (ref 136–145)
TRIGL SERPL-MCNC: 57 MG/DL (ref 30–150)
TSH SERPL DL<=0.005 MIU/L-ACNC: 3.99 UIU/ML (ref 0.4–4)
WBC # BLD AUTO: 7.41 K/UL (ref 3.9–12.7)

## 2023-04-05 PROCEDURE — 99999 PR PBB SHADOW E&M-EST. PATIENT-LVL IV: CPT | Mod: PBBFAC,,, | Performed by: INTERNAL MEDICINE

## 2023-04-05 PROCEDURE — 3079F DIAST BP 80-89 MM HG: CPT | Mod: CPTII,S$GLB,, | Performed by: INTERNAL MEDICINE

## 2023-04-05 PROCEDURE — 1160F RVW MEDS BY RX/DR IN RCRD: CPT | Mod: CPTII,S$GLB,, | Performed by: INTERNAL MEDICINE

## 2023-04-05 PROCEDURE — 3008F BODY MASS INDEX DOCD: CPT | Mod: CPTII,S$GLB,, | Performed by: INTERNAL MEDICINE

## 2023-04-05 PROCEDURE — 3079F PR MOST RECENT DIASTOLIC BLOOD PRESSURE 80-89 MM HG: ICD-10-PCS | Mod: CPTII,S$GLB,, | Performed by: INTERNAL MEDICINE

## 2023-04-05 PROCEDURE — 80053 COMPREHEN METABOLIC PANEL: CPT | Performed by: INTERNAL MEDICINE

## 2023-04-05 PROCEDURE — 84443 ASSAY THYROID STIM HORMONE: CPT | Performed by: INTERNAL MEDICINE

## 2023-04-05 PROCEDURE — 3044F PR MOST RECENT HEMOGLOBIN A1C LEVEL <7.0%: ICD-10-PCS | Mod: CPTII,S$GLB,, | Performed by: INTERNAL MEDICINE

## 2023-04-05 PROCEDURE — 85025 COMPLETE CBC W/AUTO DIFF WBC: CPT | Performed by: INTERNAL MEDICINE

## 2023-04-05 PROCEDURE — 1159F MED LIST DOCD IN RCRD: CPT | Mod: CPTII,S$GLB,, | Performed by: INTERNAL MEDICINE

## 2023-04-05 PROCEDURE — 3044F HG A1C LEVEL LT 7.0%: CPT | Mod: CPTII,S$GLB,, | Performed by: INTERNAL MEDICINE

## 2023-04-05 PROCEDURE — 82306 VITAMIN D 25 HYDROXY: CPT | Performed by: INTERNAL MEDICINE

## 2023-04-05 PROCEDURE — 3075F SYST BP GE 130 - 139MM HG: CPT | Mod: CPTII,S$GLB,, | Performed by: INTERNAL MEDICINE

## 2023-04-05 PROCEDURE — 99214 OFFICE O/P EST MOD 30 MIN: CPT | Mod: 25,S$GLB,, | Performed by: INTERNAL MEDICINE

## 2023-04-05 PROCEDURE — 1159F PR MEDICATION LIST DOCUMENTED IN MEDICAL RECORD: ICD-10-PCS | Mod: CPTII,S$GLB,, | Performed by: INTERNAL MEDICINE

## 2023-04-05 PROCEDURE — 1160F PR REVIEW ALL MEDS BY PRESCRIBER/CLIN PHARMACIST DOCUMENTED: ICD-10-PCS | Mod: CPTII,S$GLB,, | Performed by: INTERNAL MEDICINE

## 2023-04-05 PROCEDURE — 3075F PR MOST RECENT SYSTOLIC BLOOD PRESS GE 130-139MM HG: ICD-10-PCS | Mod: CPTII,S$GLB,, | Performed by: INTERNAL MEDICINE

## 2023-04-05 PROCEDURE — 83036 HEMOGLOBIN GLYCOSYLATED A1C: CPT | Performed by: INTERNAL MEDICINE

## 2023-04-05 PROCEDURE — 90471 TD VACCINE GREATER THAN OR EQUAL TO 7YO PRESERVATIVE FREE IM: ICD-10-PCS | Mod: S$GLB,,, | Performed by: INTERNAL MEDICINE

## 2023-04-05 PROCEDURE — 90714 TD VACCINE GREATER THAN OR EQUAL TO 7YO PRESERVATIVE FREE IM: ICD-10-PCS | Mod: S$GLB,,, | Performed by: INTERNAL MEDICINE

## 2023-04-05 PROCEDURE — 36415 COLL VENOUS BLD VENIPUNCTURE: CPT | Mod: PO | Performed by: INTERNAL MEDICINE

## 2023-04-05 PROCEDURE — 90714 TD VACC NO PRESV 7 YRS+ IM: CPT | Mod: S$GLB,,, | Performed by: INTERNAL MEDICINE

## 2023-04-05 PROCEDURE — 3008F PR BODY MASS INDEX (BMI) DOCUMENTED: ICD-10-PCS | Mod: CPTII,S$GLB,, | Performed by: INTERNAL MEDICINE

## 2023-04-05 PROCEDURE — 99214 PR OFFICE/OUTPT VISIT, EST, LEVL IV, 30-39 MIN: ICD-10-PCS | Mod: 25,S$GLB,, | Performed by: INTERNAL MEDICINE

## 2023-04-05 PROCEDURE — 99999 PR PBB SHADOW E&M-EST. PATIENT-LVL IV: ICD-10-PCS | Mod: PBBFAC,,, | Performed by: INTERNAL MEDICINE

## 2023-04-05 PROCEDURE — 90471 IMMUNIZATION ADMIN: CPT | Mod: S$GLB,,, | Performed by: INTERNAL MEDICINE

## 2023-04-05 PROCEDURE — 80061 LIPID PANEL: CPT | Performed by: INTERNAL MEDICINE

## 2023-04-05 RX ORDER — LOSARTAN POTASSIUM AND HYDROCHLOROTHIAZIDE 12.5; 5 MG/1; MG/1
1 TABLET ORAL DAILY
Qty: 90 TABLET | Refills: 0 | Status: CANCELLED | OUTPATIENT
Start: 2023-04-05 | End: 2024-04-04

## 2023-04-05 NOTE — PROGRESS NOTES
Health Maintenance Due   Topic     Hepatitis C Screening      TETANUS VACCINE      COVID-19 Vaccine (5 - Booster for Moderna series)
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
## 2023-04-06 ENCOUNTER — PATIENT MESSAGE (OUTPATIENT)
Dept: FAMILY MEDICINE | Facility: CLINIC | Age: 43
End: 2023-04-06
Payer: COMMERCIAL

## 2023-04-06 RX ORDER — ERGOCALCIFEROL 1.25 MG/1
50000 CAPSULE ORAL
Qty: 12 CAPSULE | Refills: 0 | Status: SHIPPED | OUTPATIENT
Start: 2023-04-06 | End: 2023-05-06

## 2023-06-15 ENCOUNTER — TELEPHONE (OUTPATIENT)
Dept: SURGERY | Facility: CLINIC | Age: 43
End: 2023-06-15
Payer: COMMERCIAL

## 2024-04-10 DIAGNOSIS — E11.9 TYPE 2 DIABETES MELLITUS WITHOUT COMPLICATION, WITHOUT LONG-TERM CURRENT USE OF INSULIN: ICD-10-CM

## 2025-03-02 ENCOUNTER — HOSPITAL ENCOUNTER (EMERGENCY)
Facility: HOSPITAL | Age: 45
Discharge: HOME | End: 2025-03-03
Attending: EMERGENCY MEDICINE

## 2025-03-02 DIAGNOSIS — R11.2 NAUSEA AND VOMITING: Primary | ICD-10-CM

## 2025-03-02 DIAGNOSIS — K30 INDIGESTION: ICD-10-CM

## 2025-03-02 LAB
ALBUMIN SERPL BCP-MCNC: 3.3 G/DL (ref 3.5–5.2)
ALP SERPL-CCNC: 66 U/L (ref 40–150)
ALT SERPL W/O P-5'-P-CCNC: 28 U/L (ref 10–44)
ANION GAP SERPL CALC-SCNC: 10 MMOL/L (ref 8–16)
AST SERPL-CCNC: 27 U/L (ref 10–40)
BASOPHILS # BLD AUTO: 0.03 K/UL (ref 0–0.2)
BASOPHILS NFR BLD: 0.5 % (ref 0–1.9)
BILIRUB SERPL-MCNC: 0.5 MG/DL (ref 0.1–1)
BUN SERPL-MCNC: 8 MG/DL (ref 6–20)
CALCIUM SERPL-MCNC: 9.1 MG/DL (ref 8.7–10.5)
CHLORIDE SERPL-SCNC: 102 MMOL/L (ref 95–110)
CO2 SERPL-SCNC: 25 MMOL/L (ref 23–29)
CREAT SERPL-MCNC: 1 MG/DL (ref 0.5–1.4)
DIFFERENTIAL METHOD BLD: ABNORMAL
EOSINOPHIL # BLD AUTO: 0 K/UL (ref 0–0.5)
EOSINOPHIL NFR BLD: 0.5 % (ref 0–8)
ERYTHROCYTE [DISTWIDTH] IN BLOOD BY AUTOMATED COUNT: 17.1 % (ref 11.5–14.5)
EST. GFR  (NO RACE VARIABLE): >60 ML/MIN/1.73 M^2
GLUCOSE SERPL-MCNC: 119 MG/DL (ref 70–110)
HCT VFR BLD AUTO: 33.5 % (ref 40–54)
HCV AB SERPL QL IA: NEGATIVE
HEP C VIRUS HOLD SPECIMEN: NORMAL
HGB BLD-MCNC: 10.5 G/DL (ref 14–18)
HIV 1+2 AB+HIV1 P24 AG SERPL QL IA: NEGATIVE
IMM GRANULOCYTES # BLD AUTO: 0.05 K/UL (ref 0–0.04)
IMM GRANULOCYTES NFR BLD AUTO: 0.8 % (ref 0–0.5)
LIPASE SERPL-CCNC: 31 U/L (ref 4–60)
LYMPHOCYTES # BLD AUTO: 0.8 K/UL (ref 1–4.8)
LYMPHOCYTES NFR BLD: 12.2 % (ref 18–48)
MAGNESIUM SERPL-MCNC: 1.9 MG/DL (ref 1.6–2.6)
MCH RBC QN AUTO: 26.2 PG (ref 27–31)
MCHC RBC AUTO-ENTMCNC: 31.3 G/DL (ref 32–36)
MCV RBC AUTO: 84 FL (ref 82–98)
MONOCYTES # BLD AUTO: 0.4 K/UL (ref 0.3–1)
MONOCYTES NFR BLD: 6.2 % (ref 4–15)
NEUTROPHILS # BLD AUTO: 5.2 K/UL (ref 1.8–7.7)
NEUTROPHILS NFR BLD: 79.8 % (ref 38–73)
NRBC BLD-RTO: 0 /100 WBC
PLATELET # BLD AUTO: 273 K/UL (ref 150–450)
PMV BLD AUTO: 9.6 FL (ref 9.2–12.9)
POTASSIUM SERPL-SCNC: 4.3 MMOL/L (ref 3.5–5.1)
PROT SERPL-MCNC: 7.9 G/DL (ref 6–8.4)
RBC # BLD AUTO: 4.01 M/UL (ref 4.6–6.2)
SODIUM SERPL-SCNC: 137 MMOL/L (ref 136–145)
TROPONIN I SERPL DL<=0.01 NG/ML-MCNC: <0.006 NG/ML (ref 0–0.03)
WBC # BLD AUTO: 6.45 K/UL (ref 3.9–12.7)

## 2025-03-02 PROCEDURE — 99900035 HC TECH TIME PER 15 MIN (STAT)

## 2025-03-02 PROCEDURE — 83690 ASSAY OF LIPASE: CPT | Performed by: EMERGENCY MEDICINE

## 2025-03-02 PROCEDURE — 80053 COMPREHEN METABOLIC PANEL: CPT | Performed by: EMERGENCY MEDICINE

## 2025-03-02 PROCEDURE — 84484 ASSAY OF TROPONIN QUANT: CPT | Performed by: EMERGENCY MEDICINE

## 2025-03-02 PROCEDURE — 63600175 PHARM REV CODE 636 W HCPCS: Performed by: EMERGENCY MEDICINE

## 2025-03-02 PROCEDURE — 27000190 HC CPAP FULL FACE MASK W/VALVE

## 2025-03-02 PROCEDURE — 83735 ASSAY OF MAGNESIUM: CPT | Performed by: EMERGENCY MEDICINE

## 2025-03-02 PROCEDURE — 96374 THER/PROPH/DIAG INJ IV PUSH: CPT

## 2025-03-02 PROCEDURE — 86803 HEPATITIS C AB TEST: CPT | Performed by: EMERGENCY MEDICINE

## 2025-03-02 PROCEDURE — 25000003 PHARM REV CODE 250: Performed by: EMERGENCY MEDICINE

## 2025-03-02 PROCEDURE — 93005 ELECTROCARDIOGRAM TRACING: CPT

## 2025-03-02 PROCEDURE — 27100171 HC OXYGEN HIGH FLOW UP TO 24 HOURS

## 2025-03-02 PROCEDURE — 85025 COMPLETE CBC W/AUTO DIFF WBC: CPT | Performed by: EMERGENCY MEDICINE

## 2025-03-02 PROCEDURE — 94799 UNLISTED PULMONARY SVC/PX: CPT

## 2025-03-02 PROCEDURE — 87389 HIV-1 AG W/HIV-1&-2 AB AG IA: CPT | Performed by: EMERGENCY MEDICINE

## 2025-03-02 PROCEDURE — 93010 ELECTROCARDIOGRAM REPORT: CPT | Mod: ,,, | Performed by: INTERNAL MEDICINE

## 2025-03-02 PROCEDURE — 94660 CPAP INITIATION&MGMT: CPT

## 2025-03-02 PROCEDURE — 99285 EMERGENCY DEPT VISIT HI MDM: CPT | Mod: 25

## 2025-03-02 RX ORDER — ALUMINUM HYDROXIDE, MAGNESIUM HYDROXIDE, AND SIMETHICONE 1200; 120; 1200 MG/30ML; MG/30ML; MG/30ML
30 SUSPENSION ORAL ONCE
Status: COMPLETED | OUTPATIENT
Start: 2025-03-02 | End: 2025-03-02

## 2025-03-02 RX ORDER — ONDANSETRON 4 MG/1
4 TABLET, FILM COATED ORAL EVERY 8 HOURS PRN
Qty: 12 TABLET | Refills: 0 | Status: SHIPPED | OUTPATIENT
Start: 2025-03-02

## 2025-03-02 RX ORDER — FAMOTIDINE 20 MG/1
20 TABLET, FILM COATED ORAL DAILY
Qty: 30 TABLET | Refills: 0 | Status: SHIPPED | OUTPATIENT
Start: 2025-03-02

## 2025-03-02 RX ORDER — NAPROXEN SODIUM 220 MG/1
1 TABLET, FILM COATED ORAL DAILY
COMMUNITY
Start: 2025-02-21 | End: 2026-02-21

## 2025-03-02 RX ORDER — LIDOCAINE HYDROCHLORIDE 20 MG/ML
15 SOLUTION OROPHARYNGEAL ONCE
Status: COMPLETED | OUTPATIENT
Start: 2025-03-02 | End: 2025-03-02

## 2025-03-02 RX ORDER — ONDANSETRON HYDROCHLORIDE 2 MG/ML
4 INJECTION, SOLUTION INTRAVENOUS
Status: COMPLETED | OUTPATIENT
Start: 2025-03-02 | End: 2025-03-02

## 2025-03-02 RX ADMIN — ONDANSETRON 4 MG: 2 INJECTION INTRAMUSCULAR; INTRAVENOUS at 08:03

## 2025-03-02 RX ADMIN — LIDOCAINE HYDROCHLORIDE 15 ML: 20 SOLUTION ORAL at 08:03

## 2025-03-02 RX ADMIN — ALUMINUM HYDROXIDE, MAGNESIUM HYDROXIDE, AND DIMETHICONE 30 ML: 200; 20; 200 SUSPENSION ORAL at 08:03

## 2025-03-03 VITALS
WEIGHT: 315 LBS | RESPIRATION RATE: 20 BRPM | SYSTOLIC BLOOD PRESSURE: 126 MMHG | BODY MASS INDEX: 49.44 KG/M2 | DIASTOLIC BLOOD PRESSURE: 68 MMHG | TEMPERATURE: 98 F | OXYGEN SATURATION: 99 % | HEART RATE: 89 BPM | HEIGHT: 67 IN

## 2025-03-03 LAB
OHS QRS DURATION: 94 MS
OHS QTC CALCULATION: 448 MS

## 2025-03-03 NOTE — ED PROVIDER NOTES
SCRIBE #1 NOTE: I, Carl Nathan, am scribing for, and in the presence of, Gaby Ferguson DO. I have scribed the entire note.       History     Chief Complaint   Patient presents with    Vomiting     Pt reports nausea starting at 1:45 pm today. Pt had three episodes of vomiting today. Currently no pain.      Review of patient's allergies indicates:  No Known Allergies      History of Present Illness     HPI    3/2/2025, 8:24 PM  History obtained from the patient and medical records      History of Present Illness: Juan Carlos Cash is a 44 y.o. male patient with a PMHx of HTN who presents to the Emergency Department for evaluation of N/V which began around 1:30 this afternoon. Pt reports he had 3 episodes of vomiting today. Symptoms are constant and moderate in severity. Pt states the nausea is mitigated by belching. No exacerbating factors reported. Associated sxs include indigestion. Patient denies any chest pain, SOB, fever, or constipation. Pt states he had a normal BM today. No prior Tx specified.  No further complaints or concerns at this time.       Arrival mode: Ambulance Service    PCP: No primary care provider on file.        Past Medical History:  Past Medical History:   Diagnosis Date    Hypertension        Past Surgical History:  History reviewed. No pertinent surgical history.      Family History:  Family History   Problem Relation Name Age of Onset    Gout Father      Rheum arthritis Father      Diabetes Maternal Aunt      Cancer Paternal Grandfather      No Known Problems Mother         Social History:  Social History     Tobacco Use    Smoking status: Never    Smokeless tobacco: Never   Substance and Sexual Activity    Alcohol use: Not Currently    Drug use: Never    Sexual activity: Not on file        Review of Systems     Review of Systems   Constitutional:  Negative for fever.   Respiratory:  Negative for shortness of breath.    Cardiovascular:  Negative for chest pain.   Gastrointestinal:   Positive for nausea and vomiting (3 episodes). Negative for constipation.        (+) indigestion      Physical Exam     Initial Vitals [03/02/25 1947]   BP Pulse Resp Temp SpO2   (!) 112/56 90 16 98.2 °F (36.8 °C) 99 %      MAP       --          Physical Exam  Nursing Notes and Vital Signs Reviewed.  Constitutional: Patient is in no apparent distress.  Elevated BMI.  Head: Atraumatic. Normocephalic.  Cardiovascular: Regular rate. Regular rhythm. No murmurs, rubs, or gallops.   Pulmonary/Chest: No respiratory distress. Clear to auscultation bilaterally. No wheezing or rales.  Abdominal: Soft and non-distended.  There is no tenderness.  No rebound, guarding, or rigidity.   Musculoskeletal: Moves all extremities. No obvious deformities.   Skin: Warm and dry.  Neurological:  Alert, awake, and appropriate.  Normal speech.  No acute focal neurological deficits are appreciated.  Psychiatric: Normal affect. Good eye contact. Appropriate in content.     ED Course   Procedures  ED Vital Signs:  Vitals:    03/02/25 2032 03/02/25 2100 03/02/25 2103 03/02/25 2200   BP: (!) 141/66 (!) 151/71  125/64   Pulse: 86 89 88 92   Resp: 17 19  (!) 21   Temp:       TempSrc:       SpO2: 95% 95%  98%   Weight:       Height:        03/02/25 2300 03/02/25 2315 03/03/25 0000 03/03/25 0100   BP: 131/63 129/66 (!) 160/89 (!) 147/93   Pulse: 91 102 99 98   Resp: (!) 24 (!) 28 (!) 23 (!) 24   Temp:       TempSrc:       SpO2: 98%  98% 99%   Weight:       Height:        03/03/25 0200 03/03/25 0300 03/03/25 0400 03/03/25 0500   BP: (!) 147/65 (!) 149/68 (!) 158/79 (!) 167/74   Pulse: 98 95 86 86   Resp: (!) 24 20 14 18   Temp:       TempSrc:       SpO2: 98% 98% 100% 99%   Weight:       Height:        03/03/25 0718 03/03/25 0807 03/03/25 0915   BP: (!) 123/59 133/63 126/68   Pulse: 91 84 89   Resp: 17 20 20   Temp:   98.2 °F (36.8 °C)   TempSrc:   Oral   SpO2: 96% 96% 99%   Weight:      Height:          Abnormal Lab Results:  Labs Reviewed   CBC W/  AUTO DIFFERENTIAL - Abnormal       Result Value    WBC 6.45      RBC 4.01 (*)     Hemoglobin 10.5 (*)     Hematocrit 33.5 (*)     MCV 84      MCH 26.2 (*)     MCHC 31.3 (*)     RDW 17.1 (*)     Platelets 273      MPV 9.6      Immature Granulocytes 0.8 (*)     Gran # (ANC) 5.2      Immature Grans (Abs) 0.05 (*)     Lymph # 0.8 (*)     Mono # 0.4      Eos # 0.0      Baso # 0.03      nRBC 0      Gran % 79.8 (*)     Lymph % 12.2 (*)     Mono % 6.2      Eosinophil % 0.5      Basophil % 0.5      Differential Method Automated     COMPREHENSIVE METABOLIC PANEL - Abnormal    Sodium 137      Potassium 4.3      Chloride 102      CO2 25      Glucose 119 (*)     BUN 8      Creatinine 1.0      Calcium 9.1      Total Protein 7.9      Albumin 3.3 (*)     Total Bilirubin 0.5      Alkaline Phosphatase 66      AST 27      ALT 28      eGFR >60      Anion Gap 10     HEPATITIS C ANTIBODY    Hepatitis C Ab Negative      Narrative:     Release to patient->Immediate   HEP C VIRUS HOLD SPECIMEN    HEP C Virus Hold Specimen Hold for HCV sendout      Narrative:     Release to patient->Immediate   HIV 1 / 2 ANTIBODY    HIV 1/2 Ag/Ab Negative      Narrative:     Release to patient->Immediate   LIPASE    Lipase 31     MAGNESIUM    Magnesium 1.9     TROPONIN I    Troponin I <0.006          All Lab Results:  Results for orders placed or performed during the hospital encounter of 03/02/25   EKG 12-lead    Collection Time: 03/02/25  8:45 PM   Result Value Ref Range    QRS Duration 94 ms    [facility] QTC Calculation 448 ms   Hepatitis C Antibody    Collection Time: 03/02/25  8:49 PM   Result Value Ref Range    Hepatitis C Ab Negative Negative   HCV Virus Hold Specimen    Collection Time: 03/02/25  8:49 PM   Result Value Ref Range    HEP C Virus Hold Specimen Hold for HCV sendout    HIV 1/2 Ag/Ab (4th Gen)    Collection Time: 03/02/25  8:49 PM   Result Value Ref Range    HIV 1/2 Ag/Ab Negative Negative   CBC auto differential    Collection Time: 03/02/25   8:49 PM   Result Value Ref Range    WBC 6.45 3.90 - 12.70 K/uL    RBC 4.01 (L) 4.60 - 6.20 M/uL    Hemoglobin 10.5 (L) 14.0 - 18.0 g/dL    Hematocrit 33.5 (L) 40.0 - 54.0 %    MCV 84 82 - 98 fL    MCH 26.2 (L) 27.0 - 31.0 pg    MCHC 31.3 (L) 32.0 - 36.0 g/dL    RDW 17.1 (H) 11.5 - 14.5 %    Platelets 273 150 - 450 K/uL    MPV 9.6 9.2 - 12.9 fL    Immature Granulocytes 0.8 (H) 0.0 - 0.5 %    Gran # (ANC) 5.2 1.8 - 7.7 K/uL    Immature Grans (Abs) 0.05 (H) 0.00 - 0.04 K/uL    Lymph # 0.8 (L) 1.0 - 4.8 K/uL    Mono # 0.4 0.3 - 1.0 K/uL    Eos # 0.0 0.0 - 0.5 K/uL    Baso # 0.03 0.00 - 0.20 K/uL    nRBC 0 0 /100 WBC    Gran % 79.8 (H) 38.0 - 73.0 %    Lymph % 12.2 (L) 18.0 - 48.0 %    Mono % 6.2 4.0 - 15.0 %    Eosinophil % 0.5 0.0 - 8.0 %    Basophil % 0.5 0.0 - 1.9 %    Differential Method Automated    Comprehensive metabolic panel    Collection Time: 03/02/25  8:49 PM   Result Value Ref Range    Sodium 137 136 - 145 mmol/L    Potassium 4.3 3.5 - 5.1 mmol/L    Chloride 102 95 - 110 mmol/L    CO2 25 23 - 29 mmol/L    Glucose 119 (H) 70 - 110 mg/dL    BUN 8 6 - 20 mg/dL    Creatinine 1.0 0.5 - 1.4 mg/dL    Calcium 9.1 8.7 - 10.5 mg/dL    Total Protein 7.9 6.0 - 8.4 g/dL    Albumin 3.3 (L) 3.5 - 5.2 g/dL    Total Bilirubin 0.5 0.1 - 1.0 mg/dL    Alkaline Phosphatase 66 40 - 150 U/L    AST 27 10 - 40 U/L    ALT 28 10 - 44 U/L    eGFR >60 >60 mL/min/1.73 m^2    Anion Gap 10 8 - 16 mmol/L   Lipase    Collection Time: 03/02/25  8:49 PM   Result Value Ref Range    Lipase 31 4 - 60 U/L   Magnesium    Collection Time: 03/02/25  8:49 PM   Result Value Ref Range    Magnesium 1.9 1.6 - 2.6 mg/dL   Troponin I    Collection Time: 03/02/25  8:49 PM   Result Value Ref Range    Troponin I <0.006 0.000 - 0.026 ng/mL       Imaging Results:  Imaging Results              X-Ray Chest AP Portable (Final result)  Result time 03/02/25 22:20:06      Final result by Cornelius Delgadillo MD (03/02/25 22:20:06)                   Impression:      No  acute abnormality.      Electronically signed by: Cornelius Delgadillo  Date:    03/02/2025  Time:    22:20               Narrative:    EXAMINATION:  XR CHEST AP PORTABLE    CLINICAL HISTORY:  indigestion;    TECHNIQUE:  Single frontal view of the chest was performed.    COMPARISON:  None    FINDINGS:  The lungs are clear, with normal appearance of pulmonary vasculature and no pleural effusion or pneumothorax.    Cardiomegaly the hilar and mediastinal contours are unremarkable.    Bones are intact.                                       X-Ray Abdomen AP 1 View (KUB) (Final result)  Result time 03/02/25 22:19:30      Final result by Cornelius Delgadillo MD (03/02/25 22:19:30)                   Impression:      Limited examination with no definite acute process seen.      Electronically signed by: Cornelius Delgadillo  Date:    03/02/2025  Time:    22:19               Narrative:    EXAMINATION:  XR ABDOMEN AP 1 VIEW    CLINICAL HISTORY:  Nausea with vomiting, unspecified    TECHNIQUE:  AP View(s) of the abdomen was performed.    COMPARISON:  None    FINDINGS:  Suboptimal exposure technique.  Multiple wires are lines are noted.  No definite evidence for bowel obstruction.                                       The EKG was ordered, reviewed, and independently interpreted by the ED provider.  Interpretation time: 20:45  Rate: 82 BPM  Rhythm: normal sinus rhythm  Interpretation: No significant ST changes. No STEMI.           The Emergency Provider reviewed the vital signs and test results, which are outlined above.     ED Discussion     10:50 PM: Reassessed pt at this time. Discussed with patient and/or family/caretaker all pertinent ED information and results. Discussed pt dx and plan of tx. Gave the patient all f/u and return to the ED instructions. All questions and concerns were addressed at this time. Patient and/or family/caretaker expresses understanding of information and instructions, and is comfortable with plan to discharge. Pt is  stable for discharge.     I discussed with patient and/or family/caretaker that evaluation in the ED does not suggest any emergent or life threatening medical conditions requiring immediate intervention beyond what was provided in the ED, and I believe patient is safe for discharge.  Regardless, an unremarkable evaluation in the ED does not preclude the development or presence of a serious of life threatening condition. As such, I instructed that the patient is to return immediately for any worsening or change in current symptoms.       Medical Decision Making  44-year-old male who presents with nausea, vomiting, and indigestion.  Symptoms resolved with ondansetron and GI cocktail.  Benign abdominal exam.  Normal cardiac workup.  Lipase negative for pancreatitis.  No fever or leukocytosis.  H&H is at baseline.  Normal LFTs.  Likely GERD.      Amount and/or Complexity of Data Reviewed  Labs: ordered. Decision-making details documented in ED Course.  Radiology: ordered. Decision-making details documented in ED Course.  ECG/medicine tests: ordered and independent interpretation performed. Decision-making details documented in ED Course.    Risk  OTC drugs.  Prescription drug management.       Additional MDM:   Differential Diagnosis:   GERD, acid reflux, gastritis, pancreatitis             ED Medication(s):  Medications   ondansetron injection 4 mg (4 mg Intravenous Given 3/2/25 2054)   aluminum-magnesium hydroxide-simethicone 200-200-20 mg/5 mL suspension 30 mL (30 mLs Oral Given 3/2/25 2055)     And   LIDOcaine viscous HCl 2% oral solution 15 mL (15 mLs Oral Given 3/2/25 2055)       Discharge Medication List as of 3/2/2025 10:49 PM        START taking these medications    Details   famotidine (PEPCID) 20 MG tablet Take 1 tablet (20 mg total) by mouth once daily., Starting Sun 3/2/2025, Print      ondansetron (ZOFRAN) 4 MG tablet Take 1 tablet (4 mg total) by mouth every 8 (eight) hours as needed for Nausea  (Nausea/vomiting)., Starting Sun 3/2/2025, Print              Follow-up Information       Diane Baires MD On 3/3/2025.    Specialties: Family Medicine, Internal Medicine  Contact information:  [address] [place] [address]  [phone]                                 Scribe Attestation:   Scribe #1: I performed the above scribed service and the documentation accurately describes the services I performed. I attest to the accuracy of the note.     Attending:   Physician Attestation Statement for Scribe #1: I, Gaby Ferguson DO, personally performed the services described in this documentation, as scribed by Carl Nathan, in my presence, and it is both accurate and complete.           Clinical Impression       ICD-10-CM ICD-9-CM   1. Nausea and vomiting  R11.2 787.01   2. Indigestion  K30 536.8       Disposition:   Disposition: Discharged  Condition: Stable         Gaby Ferguson DO  03/04/25 0136

## 2025-03-03 NOTE — ED NOTES
Called [facility] for an update on transport, [facility] ETA is unknown because patient needs Bariatric truck

## 2025-04-10 ENCOUNTER — TELEPHONE (OUTPATIENT)
Dept: FAMILY MEDICINE | Facility: CLINIC | Age: 45
End: 2025-04-10

## 2025-04-10 NOTE — TELEPHONE ENCOUNTER
Spoke with patient. Patient states that he is currently at [facility]. Patient states he does not know when he will get out but will call back to schedule an appointment with provider to establish care.

## 2025-04-10 NOTE — TELEPHONE ENCOUNTER
----- Message from Leni sent at 4/10/2025 10:34 AM CDT -----  Patient is requesting a sooner appointment.  Patient declined first available appointment listed as well as another facility and provider .  Patient will not accept being placed on the waitlist and is requesting a message be sent to doctor. Name of Caller:SELF  When is the first available appointment?N\A Symptoms: Est care with provider Would the patient rather a call back or a response via [facility]?CALLBACK  Best Call Back Number:[phone] Additional Information:PT WILL NOT MAKE HIS APPT ON 04-11-25 HE IS IN THE HOSPITAL

## 2025-05-08 ENCOUNTER — PATIENT MESSAGE (OUTPATIENT)
Dept: RESEARCH | Facility: HOSPITAL | Age: 45
End: 2025-05-08